# Patient Record
Sex: MALE | Race: WHITE
[De-identification: names, ages, dates, MRNs, and addresses within clinical notes are randomized per-mention and may not be internally consistent; named-entity substitution may affect disease eponyms.]

---

## 2020-03-10 ENCOUNTER — HEALTH MAINTENANCE LETTER (OUTPATIENT)
Age: 55
End: 2020-03-10

## 2021-04-08 ENCOUNTER — TELEPHONE (OUTPATIENT)
Dept: INTERNAL MEDICINE | Facility: CLINIC | Age: 56
End: 2021-04-08

## 2021-04-08 NOTE — TELEPHONE ENCOUNTER
VIRGINIE - Sagar NEVES saw  -[facility] office calling to report pt death 21 at 11:13 am . He was last on a call/meeting with his co workers the day before . Pt work called brother yesterday and brother called police for welfare check to find pt  .